# Patient Record
Sex: MALE | Race: WHITE | Employment: FULL TIME | URBAN - NONMETROPOLITAN AREA
[De-identification: names, ages, dates, MRNs, and addresses within clinical notes are randomized per-mention and may not be internally consistent; named-entity substitution may affect disease eponyms.]

---

## 2018-01-12 ENCOUNTER — INITIAL CONSULT (OUTPATIENT)
Dept: SURGERY | Age: 23
End: 2018-01-12
Payer: COMMERCIAL

## 2018-01-12 ENCOUNTER — HOSPITAL ENCOUNTER (OUTPATIENT)
Dept: LAB | Age: 23
Setting detail: SPECIMEN
Discharge: HOME | End: 2018-01-12
Payer: COMMERCIAL

## 2018-01-12 VITALS
BODY MASS INDEX: 25.06 KG/M2 | DIASTOLIC BLOOD PRESSURE: 76 MMHG | SYSTOLIC BLOOD PRESSURE: 120 MMHG | HEART RATE: 72 BPM | WEIGHT: 179 LBS | TEMPERATURE: 97 F | RESPIRATION RATE: 12 BRPM | HEIGHT: 71 IN

## 2018-01-12 DIAGNOSIS — R19.7 DIARRHEA, UNSPECIFIED TYPE: ICD-10-CM

## 2018-01-12 DIAGNOSIS — Z72.0 SMOKELESS TOBACCO USE: ICD-10-CM

## 2018-01-12 DIAGNOSIS — R10.13 EPIGASTRIC PAIN: ICD-10-CM

## 2018-01-12 DIAGNOSIS — R10.13 EPIGASTRIC PAIN: Primary | ICD-10-CM

## 2018-01-12 LAB
ABSOLUTE EOS #: 0.5 K/UL (ref 0–0.4)
ABSOLUTE IMMATURE GRANULOCYTE: ABNORMAL K/UL (ref 0–0.3)
ABSOLUTE LYMPH #: 2.3 K/UL (ref 1–4.8)
ABSOLUTE MONO #: 0.6 K/UL (ref 0.1–1.3)
ALBUMIN SERPL-MCNC: 4.8 G/DL (ref 3.5–5.2)
ALBUMIN/GLOBULIN RATIO: 1.8 (ref 1–2.5)
ALP BLD-CCNC: 74 U/L (ref 40–129)
ALT SERPL-CCNC: 26 U/L (ref 5–41)
ANION GAP SERPL CALCULATED.3IONS-SCNC: 13 MMOL/L (ref 9–17)
AST SERPL-CCNC: 19 U/L
BASOPHILS # BLD: 0 % (ref 0–2)
BASOPHILS ABSOLUTE: 0 K/UL (ref 0–0.2)
BILIRUB SERPL-MCNC: 0.2 MG/DL (ref 0.3–1.2)
BUN BLDV-MCNC: 15 MG/DL (ref 6–20)
BUN/CREAT BLD: 17 (ref 9–20)
CALCIUM SERPL-MCNC: 9.7 MG/DL (ref 8.6–10.4)
CHLORIDE BLD-SCNC: 100 MMOL/L (ref 98–107)
CO2: 26 MMOL/L (ref 20–31)
CREAT SERPL-MCNC: 0.87 MG/DL (ref 0.7–1.2)
DIFFERENTIAL TYPE: ABNORMAL
EOSINOPHILS RELATIVE PERCENT: 5 % (ref 1–8)
GFR AFRICAN AMERICAN: >60 ML/MIN
GFR NON-AFRICAN AMERICAN: >60 ML/MIN
GFR SERPL CREATININE-BSD FRML MDRD: ABNORMAL ML/MIN/{1.73_M2}
GFR SERPL CREATININE-BSD FRML MDRD: ABNORMAL ML/MIN/{1.73_M2}
GLUCOSE BLD-MCNC: 96 MG/DL (ref 70–99)
HCT VFR BLD CALC: 40.7 % (ref 41–53)
HEMOGLOBIN: 14.1 G/DL (ref 13.5–17.5)
IMMATURE GRANULOCYTES: ABNORMAL %
LYMPHOCYTES # BLD: 22 % (ref 15–43)
MCH RBC QN AUTO: 28.6 PG (ref 26–34)
MCHC RBC AUTO-ENTMCNC: 34.7 G/DL (ref 31–37)
MCV RBC AUTO: 82.5 FL (ref 80–100)
MONOCYTES # BLD: 5 % (ref 6–14)
PDW BLD-RTO: 13 % (ref 11–14.5)
PLATELET # BLD: 275 K/UL (ref 140–450)
PLATELET ESTIMATE: ABNORMAL
PMV BLD AUTO: 7.4 FL (ref 6–12)
POTASSIUM SERPL-SCNC: 4 MMOL/L (ref 3.7–5.3)
RBC # BLD: 4.93 M/UL (ref 4.5–5.9)
RBC # BLD: ABNORMAL 10*6/UL
SEG NEUTROPHILS: 68 % (ref 44–74)
SEGMENTED NEUTROPHILS ABSOLUTE COUNT: 7.1 K/UL (ref 1.8–7.7)
SODIUM BLD-SCNC: 139 MMOL/L (ref 135–144)
TOTAL PROTEIN: 7.4 G/DL (ref 6.4–8.3)
WBC # BLD: 10.5 K/UL (ref 4.5–13.5)
WBC # BLD: ABNORMAL 10*3/UL

## 2018-01-12 PROCEDURE — G8484 FLU IMMUNIZE NO ADMIN: HCPCS | Performed by: SURGERY

## 2018-01-12 PROCEDURE — 85025 COMPLETE CBC W/AUTO DIFF WBC: CPT

## 2018-01-12 PROCEDURE — 36415 COLL VENOUS BLD VENIPUNCTURE: CPT

## 2018-01-12 PROCEDURE — 80053 COMPREHEN METABOLIC PANEL: CPT

## 2018-01-12 PROCEDURE — 99204 OFFICE O/P NEW MOD 45 MIN: CPT | Performed by: SURGERY

## 2018-01-12 PROCEDURE — 4004F PT TOBACCO SCREEN RCVD TLK: CPT | Performed by: SURGERY

## 2018-01-12 PROCEDURE — G8427 DOCREV CUR MEDS BY ELIG CLIN: HCPCS | Performed by: SURGERY

## 2018-01-12 PROCEDURE — G8420 CALC BMI NORM PARAMETERS: HCPCS | Performed by: SURGERY

## 2018-01-12 ASSESSMENT — CROHNS DISEASE ACTIVITY INDEX (CDAI): CDAI SCORE: 0

## 2018-01-12 ASSESSMENT — ENCOUNTER SYMPTOMS
VOMITING: 0
ANAL BLEEDING: 0
ABDOMINAL PAIN: 1
DIARRHEA: 1
EYES NEGATIVE: 1
VOICE CHANGE: 0
SORE THROAT: 0
TROUBLE SWALLOWING: 0
BLOOD IN STOOL: 0
ABDOMINAL DISTENTION: 1
RECTAL PAIN: 0
CONSTIPATION: 1
RESPIRATORY NEGATIVE: 1
NAUSEA: 0
BACK PAIN: 1

## 2018-01-12 NOTE — PROGRESS NOTES
decreased concentration and dysphoric mood. The patient is not nervous/anxious and is not hyperactive. /76   Pulse 72   Temp 97 °F (36.1 °C) (Tympanic)   Resp 12   Ht 5' 11\" (1.803 m)   Wt 179 lb (81.2 kg)   BMI 24.97 kg/m²     Objective:   Physical Exam   Constitutional: He is oriented to person, place, and time. He appears well-developed and well-nourished. No distress. HENT:   Head: Normocephalic and atraumatic. Mouth/Throat: Oropharynx is clear and moist. No oropharyngeal exudate. Eyes: Conjunctivae and EOM are normal. Pupils are equal, round, and reactive to light. Right eye exhibits no discharge. Left eye exhibits no discharge. No scleral icterus. Neck: Normal range of motion. Neck supple. No tracheal deviation present. No thyromegaly present. Cardiovascular: Normal rate, regular rhythm and normal heart sounds. Pulmonary/Chest: Effort normal and breath sounds normal. No respiratory distress. He has no wheezes. He has no rales. He exhibits no tenderness. Abdominal: Soft. Bowel sounds are normal. He exhibits no distension and no mass. There is no tenderness. There is no rebound and no guarding. No hernia. Hernia confirmed negative in the ventral area, confirmed negative in the right inguinal area and confirmed negative in the left inguinal area. Lymphadenopathy:        Head (right side): No submental, no submandibular, no tonsillar, no preauricular, no posterior auricular and no occipital adenopathy present. Head (left side): No submental, no submandibular, no tonsillar, no preauricular, no posterior auricular and no occipital adenopathy present. He has no cervical adenopathy. Right cervical: No superficial cervical, no deep cervical and no posterior cervical adenopathy present. Left cervical: No superficial cervical, no deep cervical and no posterior cervical adenopathy present. He has no axillary adenopathy.         Right axillary: No pectoral and no

## 2018-01-12 NOTE — PATIENT INSTRUCTIONS
tobacco comes in many forms, such as snuff and chewing tobacco:  · Snuff is finely ground tobacco sold in cans or pouches. Most of the time, snuff is used by putting a \"pinch\" or \"dip\" between the lower lip or cheek and the gum. · Chewing tobacco is sold as loose leaves, plugs, or twists. It is chewed or placed between the cheek and the gum or teeth. There are plenty of reasons to stop using smokeless tobacco. These products are harmful. They are not risk-free alternatives to smoking. Smokeless tobacco contains nicotine, which is addicting. Though using smokeless tobacco is less harmful than smoking cigarettes, it can cause serious health problems, such as:  · White patches or red sores in your mouth that can turn into mouth cancer involving the lip, tongue, or cheek. · Tooth loss and other dental problems. · Gum disease. Your gums may pull away from your teeth and not grow back. People who use smokeless tobacco crave the nicotine in it. Giving up smokeless tobacco is much harder than simply changing a habit. Your body has to stop craving the nicotine. It is hard to quit, but you can do it. Many tools are available for people who want to quit using smokeless tobacco. You may find that combining tools works best for you. There are several steps to quitting. First you get ready to quit. Then you get support to help you. After that, you learn new skills and behaviors to quit. For many people, a necessary step is getting and using medicine. Your doctor will help you set up the plan that best meets your needs. You may want to attend a tobacco cessation program. When you choose a program, look for one that has proven success. Ask your doctor for ideas. You will greatly increase your chances of success if you take medicine as well as get counseling or join a cessation program.  Some of the changes you feel when you first quit smokeless tobacco are uncomfortable.  Your body will miss the nicotine at first, and you may feel short-tempered and grumpy. You may have trouble sleeping or concentrating. Medicine can help you deal with these symptoms. You may struggle with changing your habits and rituals. The last step is the tricky one: Be prepared for the urge to use smokeless tobacco to continue for a time. This is a lot to deal with, but keep at it. You will feel better. Follow-up care is a key part of your treatment and safety. Be sure to make and go to all appointments, and call your doctor if you are having problems. It's also a good idea to know your test results and keep a list of the medicines you take. How can you care for yourself at home? · Ask your family, friends, and coworkers for support. You have a better chance of quitting if you have help and support. · Join a support group for people who are trying to quit using smokeless tobacco.  · Set a quit date. Pick your date carefully so that it is not right in the middle of a big deadline or stressful time. After you quit, do not use smokeless tobacco even once. Get rid of all spit cups, cans, and pouches after your last use. Clean your house and your clothes so that they do not smell of tobacco.  · Learn how to be a non-user. Think about ways you can avoid those things that make you reach for tobacco.  ¨ Learn some ways to deal with cravings, like calling a friend or going for a walk. Cravings often pass. ¨ Avoid situations that put you at greatest risk for using smokeless tobacco. For some people, it is hard to spend time with friends without dipping or chewing. For others, they might skip a coffee break with coworkers who smoke or use smokeless tobacco.  ¨ Change your daily routine. Take a different route to work, or eat a meal in a different place. · Cut down on stress. Calm yourself or release tension by doing an activity you enjoy, such as reading a book, taking a hot bath, or gardening. · Talk to your doctor or pharmacist about nicotine replacement therapy.  You

## 2021-02-18 ENCOUNTER — PROCEDURE VISIT (OUTPATIENT)
Dept: SURGERY | Age: 26
End: 2021-02-18
Payer: COMMERCIAL

## 2021-02-18 VITALS
TEMPERATURE: 96.2 F | BODY MASS INDEX: 27.44 KG/M2 | HEIGHT: 71 IN | RESPIRATION RATE: 14 BRPM | DIASTOLIC BLOOD PRESSURE: 76 MMHG | SYSTOLIC BLOOD PRESSURE: 118 MMHG | WEIGHT: 196 LBS | HEART RATE: 70 BPM

## 2021-02-18 DIAGNOSIS — L72.0 EPIDERMAL CYST OF FACE: ICD-10-CM

## 2021-02-18 DIAGNOSIS — L72.9 SCALP CYST: Primary | ICD-10-CM

## 2021-02-18 PROCEDURE — G8419 CALC BMI OUT NRM PARAM NOF/U: HCPCS | Performed by: SURGERY

## 2021-02-18 PROCEDURE — 99202 OFFICE O/P NEW SF 15 MIN: CPT | Performed by: SURGERY

## 2021-02-18 PROCEDURE — G8484 FLU IMMUNIZE NO ADMIN: HCPCS | Performed by: SURGERY

## 2021-02-18 PROCEDURE — 4004F PT TOBACCO SCREEN RCVD TLK: CPT | Performed by: SURGERY

## 2021-02-18 PROCEDURE — G8427 DOCREV CUR MEDS BY ELIG CLIN: HCPCS | Performed by: SURGERY

## 2021-02-19 NOTE — PROGRESS NOTES
Julisa Rosen is a 26 y.o. male          CC:    . Cyst (scalp)      HISTORY OF PRESENT ILLNESS:    Pt is a 27 yo male here with complaints of 5 cysts in the scalp that have been bothering him more last few months. His hard hat rubs on them. He would like them excised.       Review of Systems:    General:  Fever: Negative  Weight Change:Negative  Night Sweats: Negative    Eye:  Blurry Vision:Negative  Double Vision: Negative    Ent:  Headaches: Negative  Sore throat: Negative    Allergy/Immunology:  Hives: Negative  Latex allergy: Negative    Hematology/Lymphatic:  Bleeding Problems: Negative  Blood Clots: Negative  Swollen Lymph Nodes: Negative    Lungs:  Cough: Negative  SOB: Negative  Wheezing:Negative    Cardiovascular:  Chest Pain: Negative  Palpitations:Negative    GI:   Decreased Appetite: Negative  Heartburn: Negative  Dysphagia: Negative  Nausea/Vomiting: Negative  Abdominal Pain: Negative  Change in Bowels:Negative  Constipation: Negative  Diarrhea: Negative  Rectal Bleeding: Negative    :   Dysuria: Negative  Increase Urinary Frequency/Urgency: Negative    Neuro:  Seizures: Negative  Confusion: Negative        PAST MEDICAL HISTORY:        Family History   Problem Relation Age of Onset    Cancer Paternal Grandmother         liver    No Known Problems Mother     No Known Problems Father     No Known Problems Sister     Colon Cancer Neg Hx     Colon Polyps Neg Hx     Crohn's Disease Neg Hx     Ulcerative Colitis Neg Hx      Social History     Socioeconomic History    Marital status: Single     Spouse name: Not on file    Number of children: Not on file    Years of education: Not on file    Highest education level: Not on file   Occupational History    Occupation:    Social Needs    Financial resource strain: Not on file    Food insecurity     Worry: Not on file     Inability: Not on file    Transportation needs     Medical: Not on file     Non-medical: Not on file   Tobacco Use  Smoking status: Never Smoker    Smokeless tobacco: Current User     Types: Chew   Substance and Sexual Activity    Alcohol use: Yes     Comment: 1 beer every couple of months.  Drug use: No    Sexual activity: Not on file   Lifestyle    Physical activity     Days per week: Not on file     Minutes per session: Not on file    Stress: Not on file   Relationships    Social connections     Talks on phone: Not on file     Gets together: Not on file     Attends Christian service: Not on file     Active member of club or organization: Not on file     Attends meetings of clubs or organizations: Not on file     Relationship status: Not on file    Intimate partner violence     Fear of current or ex partner: Not on file     Emotionally abused: Not on file     Physically abused: Not on file     Forced sexual activity: Not on file   Other Topics Concern    Not on file   Social History Narrative    Not on file     Past Surgical History:   Procedure Laterality Date    WISDOM TOOTH EXTRACTION       History reviewed. No pertinent past medical history. No current outpatient medications on file prior to visit. No current facility-administered medications on file prior to visit. Allergies as of 02/18/2021 - Review Complete 02/18/2021   Allergen Reaction Noted    Ceclor [cefaclor]  01/12/2018    Lac bovis Other (See Comments) 01/31/2018    Pcn [penicillins]  01/12/2018       PHYSICAL EXAM:    Blood pressure 118/76, pulse 70, temperature 96.2 °F (35.7 °C), temperature source Tympanic, resp. rate 14, height 5' 11\" (1.803 m), weight 196 lb (88.9 kg). Gen:  A and O x 3, NAD, well nourished  Eyes:  Sclera non icterus, PERRL, 5 mm cyst upper eyelid  Lungs:  CTA, symmetrical  Chest:  RRR, no murmurs  Abd:  Soft, NT, ND, no HSM, no bruits  Scalp:  4 cyst in the scalp ranging from 5 mm to 15mm.       ASSESS MENT:    1.  4 cyst scalp  2.  1 cyst right upper eyebrow        PLAN:

## 2021-08-01 ENCOUNTER — HOSPITAL ENCOUNTER (EMERGENCY)
Age: 26
Discharge: HOME | End: 2021-08-01
Attending: EMERGENCY MEDICINE
Payer: COMMERCIAL

## 2021-08-01 VITALS
TEMPERATURE: 96.8 F | OXYGEN SATURATION: 98 % | BODY MASS INDEX: 29.4 KG/M2 | RESPIRATION RATE: 14 BRPM | WEIGHT: 210 LBS | DIASTOLIC BLOOD PRESSURE: 74 MMHG | SYSTOLIC BLOOD PRESSURE: 132 MMHG | HEIGHT: 71 IN | HEART RATE: 78 BPM

## 2021-08-01 DIAGNOSIS — T15.01XA FOREIGN BODY OF RIGHT CORNEA, INITIAL ENCOUNTER: Primary | ICD-10-CM

## 2021-08-01 PROCEDURE — 6370000000 HC RX 637 (ALT 250 FOR IP)

## 2021-08-01 PROCEDURE — 99283 EMERGENCY DEPT VISIT LOW MDM: CPT

## 2021-08-01 PROCEDURE — 65222 REMOVE FOREIGN BODY FROM EYE: CPT

## 2021-08-01 RX ORDER — SULFACETAMIDE SODIUM 100 MG/ML
1 SOLUTION/ DROPS OPHTHALMIC 4 TIMES DAILY
Status: DISCONTINUED | OUTPATIENT
Start: 2021-08-01 | End: 2021-08-01 | Stop reason: HOSPADM

## 2021-08-01 RX ORDER — SULFACETAMIDE SODIUM 100 MG/ML
SOLUTION/ DROPS OPHTHALMIC
Status: COMPLETED
Start: 2021-08-01 | End: 2021-08-01

## 2021-08-01 RX ADMIN — SULFACETAMIDE SODIUM 1 DROP: 100 SOLUTION/ DROPS OPHTHALMIC at 03:11

## 2021-08-01 ASSESSMENT — PAIN SCALES - GENERAL: PAINLEVEL_OUTOF10: 6

## 2021-08-01 ASSESSMENT — PAIN DESCRIPTION - PAIN TYPE: TYPE: ACUTE PAIN

## 2021-08-01 ASSESSMENT — PAIN DESCRIPTION - LOCATION: LOCATION: EYE

## 2021-08-01 ASSESSMENT — PAIN DESCRIPTION - ORIENTATION: ORIENTATION: RIGHT

## 2021-08-01 ASSESSMENT — PAIN DESCRIPTION - DESCRIPTORS: DESCRIPTORS: SHOOTING

## 2021-08-01 NOTE — ED PROVIDER NOTES
distress  HEENT: Head is atraumatic conjunctiva are clear bilateral pupils are equal and reactive slit-lamp examination reveals a small metallic foreign body with associated rust ring at approximately the 7 o'clock position mid corneal region anterior chamber is within normal  Respiratory: Patient has nonlabored respirations    DIFFERENTIAL DIAGNOSIS/ MDM:     Foreign body    DIAGNOSTIC RESULTS     EKG: All EKG's are interpreted by the Emergency Department Physician who either signs or Co-signs this chart in the absence of a cardiologist.        RADIOLOGY:   I directly visualized the following  images and reviewed the radiologist interpretations:  No orders to display         LABS:  Labs Reviewed - No data to display      EMERGENCY DEPARTMENT COURSE:   Vitals:    Vitals:    08/01/21 0238 08/01/21 0314   BP: (!) 152/94 132/74   Pulse: 82 78   Resp: 14 14   Temp: 96.8 °F (36 °C)    TempSrc: Tympanic    SpO2: 100% 98%   Weight: 210 lb (95.3 kg)    Height: 5' 11\" (1.803 m)      -------------------------  BP: 132/74, Temp: 96.8 °F (36 °C), Pulse: 78, Resp: 14    Orders Placed This Encounter   Medications    DISCONTD: sulfacetamide (BLEPH-10) 10 % ophthalmic solution 1 drop    sulfacetamide (BLEPH-10) 10 % ophthalmic solution     ABDI AZAR: cabinet override           Re-evaluation Notes    Foreign body was removed easily with Q-tip swab however there was some residual rust ring present no further foreign bodies were identified patient will be placed on Bleph-10 at this time follow-up to ophthalmology for removal of rust ring return if worse    CRITICAL CARE:   None      CONSULTS:      PROCEDURES:  None    FINAL IMPRESSION      1.  Foreign body of right cornea, initial encounter          DISPOSITION/PLAN   DISPOSITION Decision To Discharge 08/01/2021 02:54:14 AM      Condition on Disposition    Stable    PATIENT REFERRED TO:  Paxton Mackey MD  [id number]  [phone]    Call in 1 day  For Hospital Follow Up      DISCHARGE MEDICATIONS:  There are no discharge medications for this patient. (Please note that portions of this note were completed with a voice recognition program.  Efforts were made to edit the dictations but occasionally words are mis-transcribed.)    Agus Randolph MD,, MD, F.A.C.E.P.   Attending Emergency Physician        Agus Randolph MD  08/01/21 4003

## 2021-12-02 PROBLEM — Z72.0 SMOKELESS TOBACCO USE: Status: RESOLVED | Noted: 2018-01-12 | Resolved: 2021-12-02

## 2022-10-27 ENCOUNTER — PROCEDURE VISIT (OUTPATIENT)
Dept: SURGERY | Age: 27
End: 2022-10-27
Payer: COMMERCIAL

## 2022-10-27 ENCOUNTER — HOSPITAL ENCOUNTER (OUTPATIENT)
Age: 27
Setting detail: SPECIMEN
Discharge: HOME | End: 2022-10-27
Payer: COMMERCIAL

## 2022-10-27 VITALS
TEMPERATURE: 97.1 F | DIASTOLIC BLOOD PRESSURE: 60 MMHG | BODY MASS INDEX: 27.92 KG/M2 | HEIGHT: 71 IN | OXYGEN SATURATION: 98 % | SYSTOLIC BLOOD PRESSURE: 120 MMHG | HEART RATE: 76 BPM | WEIGHT: 199.4 LBS

## 2022-10-27 DIAGNOSIS — L72.9 SCALP CYST: ICD-10-CM

## 2022-10-27 DIAGNOSIS — L72.9 SCALP CYST: Primary | ICD-10-CM

## 2022-10-27 PROCEDURE — 88304 TISSUE EXAM BY PATHOLOGIST: CPT

## 2022-10-27 PROCEDURE — 11422 EXC H-F-NK-SP B9+MARG 1.1-2: CPT | Performed by: SURGERY

## 2022-10-31 LAB — DERMATOLOGY PATHOLOGY REPORT: NORMAL

## 2023-11-07 ENCOUNTER — OFFICE VISIT (OUTPATIENT)
Dept: FAMILY MEDICINE CLINIC | Age: 28
End: 2023-11-07
Payer: COMMERCIAL

## 2023-11-07 VITALS
HEIGHT: 71 IN | HEART RATE: 98 BPM | SYSTOLIC BLOOD PRESSURE: 110 MMHG | BODY MASS INDEX: 27.81 KG/M2 | OXYGEN SATURATION: 98 % | DIASTOLIC BLOOD PRESSURE: 74 MMHG

## 2023-11-07 DIAGNOSIS — M62.838 MUSCLE SPASMS OF BOTH LOWER EXTREMITIES: ICD-10-CM

## 2023-11-07 DIAGNOSIS — S82.91XD CLOSED FRACTURE OF MULTIPLE BONES OF BOTH LOWER EXTREMITIES WITH ROUTINE HEALING, SUBSEQUENT ENCOUNTER: Primary | ICD-10-CM

## 2023-11-07 DIAGNOSIS — S82.92XD CLOSED FRACTURE OF MULTIPLE BONES OF BOTH LOWER EXTREMITIES WITH ROUTINE HEALING, SUBSEQUENT ENCOUNTER: Primary | ICD-10-CM

## 2023-11-07 PROCEDURE — 99204 OFFICE O/P NEW MOD 45 MIN: CPT | Performed by: STUDENT IN AN ORGANIZED HEALTH CARE EDUCATION/TRAINING PROGRAM

## 2023-11-07 RX ORDER — IBUPROFEN 800 MG/1
TABLET ORAL
COMMUNITY
Start: 2023-10-31

## 2023-11-07 RX ORDER — DIAZEPAM 5 MG/1
5 TABLET ORAL EVERY 8 HOURS PRN
Qty: 30 TABLET | Refills: 0 | Status: SHIPPED | OUTPATIENT
Start: 2023-11-07 | End: 2023-11-17

## 2023-11-07 RX ORDER — ENOXAPARIN SODIUM 100 MG/ML
INJECTION SUBCUTANEOUS
COMMUNITY
Start: 2023-10-31

## 2023-11-07 RX ORDER — OXYCODONE HYDROCHLORIDE 5 MG/1
TABLET ORAL
COMMUNITY
Start: 2023-10-31

## 2023-11-07 RX ORDER — DIAZEPAM 5 MG/1
TABLET ORAL
COMMUNITY
Start: 2023-10-31 | End: 2023-11-07 | Stop reason: SDUPTHER

## 2023-11-07 SDOH — ECONOMIC STABILITY: HOUSING INSECURITY
IN THE LAST 12 MONTHS, WAS THERE A TIME WHEN YOU DID NOT HAVE A STEADY PLACE TO SLEEP OR SLEPT IN A SHELTER (INCLUDING NOW)?: NO

## 2023-11-07 SDOH — ECONOMIC STABILITY: INCOME INSECURITY: HOW HARD IS IT FOR YOU TO PAY FOR THE VERY BASICS LIKE FOOD, HOUSING, MEDICAL CARE, AND HEATING?: NOT HARD AT ALL

## 2023-11-07 SDOH — ECONOMIC STABILITY: FOOD INSECURITY: WITHIN THE PAST 12 MONTHS, THE FOOD YOU BOUGHT JUST DIDN'T LAST AND YOU DIDN'T HAVE MONEY TO GET MORE.: NEVER TRUE

## 2023-11-07 SDOH — ECONOMIC STABILITY: FOOD INSECURITY: WITHIN THE PAST 12 MONTHS, YOU WORRIED THAT YOUR FOOD WOULD RUN OUT BEFORE YOU GOT MONEY TO BUY MORE.: NEVER TRUE

## 2023-11-07 ASSESSMENT — PATIENT HEALTH QUESTIONNAIRE - PHQ9
2. FEELING DOWN, DEPRESSED OR HOPELESS: 0
SUM OF ALL RESPONSES TO PHQ QUESTIONS 1-9: 0
1. LITTLE INTEREST OR PLEASURE IN DOING THINGS: 0
SUM OF ALL RESPONSES TO PHQ QUESTIONS 1-9: 0
SUM OF ALL RESPONSES TO PHQ9 QUESTIONS 1 & 2: 0
SUM OF ALL RESPONSES TO PHQ QUESTIONS 1-9: 0
SUM OF ALL RESPONSES TO PHQ QUESTIONS 1-9: 0
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
## 2023-11-08 ASSESSMENT — ENCOUNTER SYMPTOMS
TROUBLE SWALLOWING: 0
NAUSEA: 0
DIARRHEA: 0
VOMITING: 0
CONSTIPATION: 0
COUGH: 0
EYE PAIN: 0
ABDOMINAL PAIN: 0
BLOOD IN STOOL: 0
SHORTNESS OF BREATH: 0

## 2023-12-27 ENCOUNTER — HOSPITAL ENCOUNTER (OUTPATIENT)
Dept: PHYSICAL THERAPY | Age: 28
Setting detail: THERAPIES SERIES
Discharge: HOME | End: 2023-12-27
Payer: COMMERCIAL

## 2023-12-27 PROCEDURE — 97110 THERAPEUTIC EXERCISES: CPT | Performed by: PHYSICAL THERAPY ASSISTANT

## 2023-12-27 NOTE — FLOWSHEET NOTE
Physical Therapy Daily Treatment Note    Date:  2023    Patient Name:  Lucrecia Madrid    :  1995  MRN: [id number]  Restrictions/Precautions:   NWB R LE, WBAT L LE in Cam Boot--Able to wean to a shoe   Medical/Treatment Diagnosis Information:     S82.871D Closed Displaced Pilon Fracture of R Tibia with routine healing  S82.892D Closed Displaced L ankle with Routine Healing            Insurance/Certification information:  [facility]  Physician Information:   Wilber Martinez PA-C    Plan of care signed (Y/N):  n  Visit# / total visits:  3/10  Pain level: /10       RTD: 2024    Time In: 1035  Time Out: 1059    Progress Note: []  Yes  [x]  No  Next due by: Visit #10      Subjective:  Pain this date rated 0/10 through ankles this date. Soreness only. Objective: MARY complete per flow chart to facilitate strength, motion and stability to allow ease with daily activities and ambulation. Verbal cuing for progression, technique and order of exercises. Difficulty with and range of motion remains but overall improvement noted. Initiated and advanced several exercises to improve strength and motion. Observation:   Test measurements:      Exercises: T-band ok on the Left. No resistance on the R.    Exercise/Equipment Resistance/Repetitions Other comments        Gt Training with Walker  1 lap         Ankle AROM bilaterally  15x HEP      HEP    Ankle Circles  15x ea HEP    Ankle ABC's  1x ea HEP    L Baps  10x ea    L standing HR/TR  10x     t-band ankle  Left only, x15 red    Seated March/LAQ  10x3#         3-way hip 15x Open chain, WB on left    15x                   [] Provided verbal/tactile cueing for activities related to strengthening, flexibility, endurance, ROM. (90800)  [] Provided verbal/tactile cueing for activities related to improving balance, coordination, kinesthetic sense, posture, motor skill, proprioception. (91329)    Therapeutic Activities:     [] Therapeutic activities, direct

## 2023-12-28 ENCOUNTER — HOSPITAL ENCOUNTER (OUTPATIENT)
Dept: PHYSICAL THERAPY | Age: 28
Setting detail: THERAPIES SERIES
Discharge: HOME | End: 2023-12-28
Payer: COMMERCIAL

## 2023-12-28 PROCEDURE — 97110 THERAPEUTIC EXERCISES: CPT

## 2023-12-28 NOTE — FLOWSHEET NOTE
mod I  Long Term Goal 5: Improve Foot Function Index to 50% DIsabiltiy or less. Additional Goals?: Yes  Long term goal 6: Add additional goals upon allowance for Weightbearing of the R LE. Plan:   [x] Continue per plan of care [] Alter current plan (see comments)  [] Plan of care initiated [] Hold pending MD visit [] Discharge    Plan for Next Session:  Monitor tolerance to open chain exercises and advance as able.      Electronically signed by:  Nicola Champion PT

## 2024-01-03 ENCOUNTER — HOSPITAL ENCOUNTER (OUTPATIENT)
Dept: PHYSICAL THERAPY | Age: 29
Setting detail: THERAPIES SERIES
Discharge: HOME | End: 2024-01-03
Payer: COMMERCIAL

## 2024-01-03 PROCEDURE — 97110 THERAPEUTIC EXERCISES: CPT

## 2024-01-03 NOTE — FLOWSHEET NOTE
Activities:     [] Therapeutic activities, direct (one-on-one) patient contact (use of dynamic activities to improve functional performance). (78814)    Gait:   [] Provided training and instruction to the patient for ambulation re-education. (49576)    Self-Care/ADL's  [] Self-care/home management training and compensatory training, meal preparation, safety procedures, and instructions in use of assistive technology devices/adaptive equipment, direct one-on-one contact. (43188)    Home Exercise Program:     [] Reviewed/Progressed HEP activities related to strengthening, flexibility, endurance, ROM. (94416)  [] Reviewed/Progressed HEP activities related to improving balance, coordination, kinesthetic sense, posture, motor skill, proprioception.  (22959)    Manual Treatments:    [] Provided manual therapy to mobilize soft tissue/joints for the purpose of modulating pain, promoting relaxation,  increasing ROM, reducing/eliminating soft tissue swelling/inflammation/restriction, improving soft tissue extensibility. (53874)    Service Based Modalities:      Timed Code Treatment Minutes:   25' ex     Total Treatment Minutes:   25''    Treatment/Activity Tolerance:  [x] Patient tolerated treatment well [] Patient limited by fatique  [] Patient limited by pain  [] Patient limited by other medical complications  [] Other:     Prognosis: [x] Good [] Fair  [] Poor    Patient Requires Follow-up: [x] Yes  [] No      Goals:  Short Term Goals  Time Frame for Short Term Goals: 3 weeks  Short Term Goal 1: Initiate HEP    Long Term Goals  Time Frame for Long Term Goals : 6 weeks  Long Term Goal 1: Independent in HEP  Long Term Goal 2: Improve Ankle AROM to DF 5, PF to 40, Inv to 35, Ev to 10 to allow for normal gait pattern.  Long Term Goal 3: Improve Left ankle strength to 5/5 to improve gait tolerance.  Long Term Goal 4: Patient to be able to amb with NWB on the R LE x 250ft with least restrictive device and mod I  Long Term Goal 5:

## 2024-01-05 ENCOUNTER — HOSPITAL ENCOUNTER (OUTPATIENT)
Dept: PHYSICAL THERAPY | Age: 29
Setting detail: THERAPIES SERIES
Discharge: HOME | End: 2024-01-05
Payer: COMMERCIAL

## 2024-01-05 PROCEDURE — 97110 THERAPEUTIC EXERCISES: CPT

## 2024-01-05 NOTE — FLOWSHEET NOTE
activities related to strengthening, flexibility, endurance, ROM. (35468)  [] Provided verbal/tactile cueing for activities related to improving balance, coordination, kinesthetic sense, posture, motor skill, proprioception. (20511)    Therapeutic Activities:     [] Therapeutic activities, direct (one-on-one) patient contact (use of dynamic activities to improve functional performance). (78530)    Gait:   [] Provided training and instruction to the patient for ambulation re-education. (79220)    Self-Care/ADL's  [] Self-care/home management training and compensatory training, meal preparation, safety procedures, and instructions in use of assistive technology devices/adaptive equipment, direct one-on-one contact. (31444)    Home Exercise Program:     [] Reviewed/Progressed HEP activities related to strengthening, flexibility, endurance, ROM. (39677)  [] Reviewed/Progressed HEP activities related to improving balance, coordination, kinesthetic sense, posture, motor skill, proprioception.  (67229)    Manual Treatments:    [] Provided manual therapy to mobilize soft tissue/joints for the purpose of modulating pain, promoting relaxation,  increasing ROM, reducing/eliminating soft tissue swelling/inflammation/restriction, improving soft tissue extensibility. (15183)    Service Based Modalities:      Timed Code Treatment Minutes:   33' ex     Total Treatment Minutes:   33'    Treatment/Activity Tolerance:  [x] Patient tolerated treatment well [] Patient limited by fatique  [] Patient limited by pain  [] Patient limited by other medical complications  [] Other:     Prognosis: [x] Good [] Fair  [] Poor    Patient Requires Follow-up: [x] Yes  [] No      Goals:  Short Term Goals  Time Frame for Short Term Goals: 3 weeks  Short Term Goal 1: Initiate HEP (compliant)    Long Term Goals  Time Frame for Long Term Goals : 6 weeks  Long Term Goal 1: Independent in HEP (compliant)  Long Term Goal 2: Improve Ankle AROM to DF 5, PF to

## 2024-01-10 ENCOUNTER — HOSPITAL ENCOUNTER (OUTPATIENT)
Dept: PHYSICAL THERAPY | Age: 29
Setting detail: THERAPIES SERIES
Discharge: HOME | End: 2024-01-10
Payer: COMMERCIAL

## 2024-01-10 PROCEDURE — 97110 THERAPEUTIC EXERCISES: CPT

## 2024-01-10 NOTE — FLOWSHEET NOTE
1: Initiate HEP (compliant)    Long Term Goals  Time Frame for Long Term Goals : 6 weeks  Long Term Goal 1: Independent in HEP (compliant)  Long Term Goal 2: Improve Ankle AROM to DF 5, PF to 40, Inv to 35, Ev to 10 to allow for normal gait pattern.  Long Term Goal 3: Improve Left ankle strength to 5/5 to improve gait tolerance.  Long Term Goal 4: Patient to be able to amb with NWB on the R LE x 250ft with least restrictive device and mod I  Long Term Goal 5: Improve Foot Function Index to 50% DIsabiltiy or less.  Additional Goals?: Yes  Long term goal 6: Add additional goals upon allowance for Weightbearing of the R LE.      Plan:   [x] Continue per plan of care [] Alter current plan (see comments)  [] Plan of care initiated [] Hold pending MD visit [] Discharge    Plan for Next Session:  Monitor tolerance to open chain exercises and advance as able.     Electronically signed by:  Cristal Silva PTA

## 2024-01-12 ENCOUNTER — HOSPITAL ENCOUNTER (OUTPATIENT)
Dept: PHYSICAL THERAPY | Age: 29
Setting detail: THERAPIES SERIES
Discharge: HOME | End: 2024-01-12
Payer: COMMERCIAL

## 2024-01-12 PROCEDURE — 97110 THERAPEUTIC EXERCISES: CPT

## 2024-01-12 NOTE — FLOWSHEET NOTE
coordination, kinesthetic sense, posture, motor skill, proprioception. (67551)    Therapeutic Activities:     [] Therapeutic activities, direct (one-on-one) patient contact (use of dynamic activities to improve functional performance). (04391)    Gait:   [] Provided training and instruction to the patient for ambulation re-education. (64408)    Self-Care/ADL's  [] Self-care/home management training and compensatory training, meal preparation, safety procedures, and instructions in use of assistive technology devices/adaptive equipment, direct one-on-one contact. (15860)    Home Exercise Program:     [] Reviewed/Progressed HEP activities related to strengthening, flexibility, endurance, ROM. (89486)  [] Reviewed/Progressed HEP activities related to improving balance, coordination, kinesthetic sense, posture, motor skill, proprioception.  (17665)    Manual Treatments:    [] Provided manual therapy to mobilize soft tissue/joints for the purpose of modulating pain, promoting relaxation,  increasing ROM, reducing/eliminating soft tissue swelling/inflammation/restriction, improving soft tissue extensibility. (49909)    Service Based Modalities:      Timed Code Treatment Minutes:   38' ex     Total Treatment Minutes:   38'    Treatment/Activity Tolerance:  [x] Patient tolerated treatment well [] Patient limited by fatique  [] Patient limited by pain  [] Patient limited by other medical complications  [] Other:     Prognosis: [x] Good [] Fair  [] Poor    Patient Requires Follow-up: [x] Yes  [] No      Goals:  Short Term Goals  Time Frame for Short Term Goals: 3 weeks  Short Term Goal 1: Initiate HEP (compliant)    Long Term Goals  Time Frame for Long Term Goals : 6 weeks  Long Term Goal 1: Independent in HEP (compliant)  Long Term Goal 2: Improve Ankle AROM to DF 5, PF to 40, Inv to 35, Ev to 10 to allow for normal gait pattern.  Long Term Goal 3: Improve Left ankle strength to 5/5 to improve gait tolerance.  Long Term Goal

## 2024-01-17 ENCOUNTER — HOSPITAL ENCOUNTER (OUTPATIENT)
Dept: PHYSICAL THERAPY | Age: 29
Setting detail: THERAPIES SERIES
Discharge: HOME | End: 2024-01-17
Payer: COMMERCIAL

## 2024-01-17 PROCEDURE — 97110 THERAPEUTIC EXERCISES: CPT

## 2024-01-17 NOTE — FLOWSHEET NOTE
Physical Therapy Daily Treatment Note    Date:  2024    Patient Name:  Adam Farias    :  1995  MRN: [id number]  Restrictions/Precautions:   NWB R LE, WBAT L LE in Cam Boot--Able to wean to a shoe   Medical/Treatment Diagnosis Information:    S82.871D Closed Displaced Pilon Fracture of R Tibia with routine healing  S82.892D Closed Displaced L ankle with Routine Healing            Insurance/Certification information:  BCBS  Physician Information:   Ximena Logan PA-C    Plan of care signed (Y/N):  n  Visit# / total visits:  9/10  Pain level: 410 Soreness on LLE, no pain on RLE      RTD: 2024    Time In: 10:15 Time Out: 10:50    Progress Note: []  Yes  [x]  No  Next due by: Visit #10      Subjective: Has been using walker full time at home.     Objective: MARY complete per flow chart to facilitate strength, motion and stability to allow ease with daily activities and ambulation. Verbal cuing for progression, technique and order of exercises. Patient included seated rest breaks into session this time, overall doing better. Quad continues to lack strength in Wbing exercises.     24-RTD    Observation:   Test measurements:      Exercises: T-band ok on the Left.  No resistance on the R.   Exercise/Equipment Resistance/Repetitions Other comments        Gt Training with RW Walker  1 lap         Ankle AROM bilaterally  3# 15x HEP      HEP    Ankle Circles  3# 15x ea HEP    Ankle ABC's  3# 1x ea HEP    L Baps  3# L2x 20x    L standing HR/TR  3# 20x     t-band ankle  Left only, x15 green    Seated March/LAQ  20x3#    Seated Hip add/abd  Ball 5\" x 20/grn x 20     3-way hip 15x, foam Open chain, WB on left   HS curls, March, SL squat 15x, foam Open chain, WB on left   STS 15x LLE WB   Golfer  10x    SL cone tap 10x, 3 cones    SL rebounder 20x    SL step tap down 10x, 4 inch          [] Provided verbal/tactile cueing for activities related to strengthening, flexibility, endurance, ROM. (55263)  []

## 2024-01-19 ENCOUNTER — HOSPITAL ENCOUNTER (OUTPATIENT)
Dept: PHYSICAL THERAPY | Age: 29
Setting detail: THERAPIES SERIES
Discharge: HOME | End: 2024-01-19
Payer: COMMERCIAL

## 2024-01-19 PROCEDURE — 97110 THERAPEUTIC EXERCISES: CPT | Performed by: PHYSICAL THERAPY ASSISTANT

## 2024-01-19 NOTE — FLOWSHEET NOTE
Physical Therapy Daily Treatment Note    Date:  2024    Patient Name:  Adam Farias    :  1995  MRN: [id number]  Restrictions/Precautions:   NWB R LE, WBAT L LE in Cam Boot--Able to wean to a shoe   Medical/Treatment Diagnosis Information:    S82.871D Closed Displaced Pilon Fracture of R Tibia with routine healing  S82.892D Closed Displaced L ankle with Routine Healing            Insurance/Certification information:  [facility]  Physician Information:   Ximena Logan PA-C    Plan of care signed (Y/N):  n  Visit# / total visits:  9/10  Pain level: 4/10 Soreness on LLE, no pain on RLE      RTD: 2024    Time In: 10:00 Time Out: 1039    Progress Note: []  Yes  [x]  No  Next due by: Visit #10      Subjective: Has been using walker full time at home. Notes taking boot off when at home. Pain this date rated 0/10.     Objective: MARY complete per flow chart to facilitate strength, motion and stability to allow ease with daily activities and ambulation. Verbal cuing for progression, technique and order of exercises. Patient included seated rest breaks into session this time, overall doing better. Quad continues to lack strength in Wbing exercises. Initiated and advanced several exercises to improve motion and strength.     24-RTD    Observation:   Test measurements:      Exercises: T-band ok on the Left.  No resistance on the R.   Exercise/Equipment Resistance/Repetitions Other comments        Gt Training with RW Walker  1 lap         Ankle AROM bilaterally  3# 15x HEP      HEP    Ankle Circles  3# 15x ea HEP    Ankle ABC's  3# 1x ea HEP    L Baps  3# L2x 20x    L standing HR/TR  3# 20x     t-band ankle  Left only, x15 green    Seated March/LAQ  20x3#    Seated Hip add/abd  Ball 5\" x 20/grn x 20     3-way hip 20x, foam Open chain, WB on left   HS curls, March, SL squat 20x, foam Open chain, WB on left   STS 15x LLE WB   Golfer  10x    SL cone tap 10x, 3 cones    SL rebounder 20x    SL step tap down 10x, 4

## 2024-01-24 ENCOUNTER — HOSPITAL ENCOUNTER (OUTPATIENT)
Dept: PHYSICAL THERAPY | Age: 29
Setting detail: THERAPIES SERIES
Discharge: HOME | End: 2024-01-24
Payer: COMMERCIAL

## 2024-01-24 PROCEDURE — 97110 THERAPEUTIC EXERCISES: CPT

## 2024-01-24 NOTE — FLOWSHEET NOTE
Physical Therapy Daily Treatment Note    Date:  2024    Patient Name:  Adam Farias    :  1995  MRN: [id number]  Restrictions/Precautions:   NWB R LE, WBAT L LE in Cam Boot--Able to wean to a shoe   Medical/Treatment Diagnosis Information:    S82.871D Closed Displaced Pilon Fracture of R Tibia with routine healing  S82.892D Closed Displaced L ankle with Routine Healing            Insurance/Certification information:  [employer]  Physician Information:   Ximena Logan PA-C    Plan of care signed (Y/N):  n  Visit# / total visits:  10/10  Pain level: 4/10 Soreness on LLE, no pain on RLE      RTD: 2024    Time In: 10:13 Time Out: 10:55    Progress Note: []  Yes  [x]  No  Next due by: Visit #10      Subjective: Has been using walker full time at home. Notes taking boot off when at home. Pain this date rated 0/10.     Objective: MARY complete per flow chart to facilitate strength, motion and stability to allow ease with daily activities and ambulation. Verbal cuing for progression, technique and order of exercises. Compliant and updated HEP. Initiated supine exercises this date.       24-RTD    Observation:   Test measurements: RLE  PF:40, INV: 28, EV: 18, DF: 12     LLE: DF: 4+/5, PF: 4+/5, IV: 4+/5, EV: 4+/5    Exercises: T-band ok on the Left.  No resistance on the R.   Exercise/Equipment Resistance/Repetitions Other comments        Gt Training with RW Walker  1 lap         Ankle AROM bilaterally  3# 15x HEP      HEP    Ankle Circles  3# 15x ea HEP    Ankle ABC's  3# 1x ea HEP    L Baps  3# L2x 20x    L standing HR/TR  3# 20x     t-band ankle  Left only, x15 green    Seated March/LAQ  20x3#    Seated Hip add/abd  Ball 5\" x 20/grn x 20     3-way hip 20x, foam Open chain, WB on left   HS curls, March, SL squat 20x, foam Open chain, WB on left   STS 15x LLE WB   Golfer  10x    SL cone tap 10x, 3 cones    SL rebounder 20x 6# ball   SL step tap down 20x, 4 inch          S/L bridge 15x R extended

## 2024-01-25 NOTE — PLAN OF CARE
[facility]  Rehabilitation and Sports Medicine    [x] [place]  Phone: [phone]  Fax: [phone]      [] [place]  Phone: [phone]  Fax: [phone]    Physical Therapy Progress Note  Date: 2024        Patient Name:  Adam Farias    :  1995  MRN: [id number]  Restrictions/Precautions:   NWB R LE, WBAT L LE in Cam Boot--Able to wean to a shoe   Medical/Treatment Diagnosis Information:    S82.871D Closed Displaced Pilon Fracture of R Tibia with routine healing  S82.892D Closed Displaced L ankle with Routine Healing            Insurance/Certification information:  [facility]  Physician Information:   Ximena Logan PA-C    Plan of care signed (Y/N):  n  Visit# / total visits:  10/10  Pain level:      4/10     Soreness on LLE, no pain on RLE        Time Period for Report: 24-24   Cancels/No-shows to date:  0    Plan of Care/Treatment to date:  [x] Therapeutic Exercise    [] Modalities:  [x] Therapeutic Activity     [] Ultrasound  [] Electrical Stimulation  [x] Gait Training      [] Cervical Traction    [] Lumbar Traction  [x] Neuromuscular Re-education  [] Cold/hotpack [] Iontophoresis  [x] Instruction in HEP      Other:  [x] Manual Therapy       []    [] Aquatic Therapy       []                           Subjective:       Has been using walker full time at home. Notes taking boot off when at home. Pain this date rated 0/10.      Objective: MARY complete per flow chart to facilitate strength, motion and stability to allow ease with daily activities and ambulation. Verbal cuing for progression, technique and order of exercises. Compliant and updated HEP. Initiated supine exercises this date.         24-RTD     Observation:   Test measurements: RLE  PF:40, INV: 28, EV: 18, DF: 12     LLE: DF: 4+/5, PF: 4+/5, IV: 4+/5, EV: 4+/5     Assessment:  Patient progressing towards all goals, continues to have decreased strength and AROM on the L.  Continues to be NWB on the R.

## 2024-01-26 ENCOUNTER — HOSPITAL ENCOUNTER (OUTPATIENT)
Dept: PHYSICAL THERAPY | Age: 29
Setting detail: THERAPIES SERIES
Discharge: HOME | End: 2024-01-26
Payer: COMMERCIAL

## 2024-01-26 PROCEDURE — 97110 THERAPEUTIC EXERCISES: CPT

## 2024-01-26 NOTE — FLOWSHEET NOTE
Physical Therapy Daily Treatment Note    Date:  2024    Patient Name:  Adam Farias    :  1995  MRN: [id number]  Restrictions/Precautions:   NWB R LE, WBAT L LE in Cam Boot--Able to wean to a shoe   Medical/Treatment Diagnosis Information:    S82.871D Closed Displaced Pilon Fracture of R Tibia with routine healing  S82.892D Closed Displaced L ankle with Routine Healing            Insurance/Certification information:  [employer]  Physician Information:   Ximena Logan PA-C    Plan of care signed (Y/N):  n  Visit# / total visits:   2nd POC Total: 11  Pain level: 4/10 Soreness on LLE, no pain on RLE      RTD: 2024    Time In: 10:13 Time Out: 10:55    Progress Note: []  Yes  [x]  No  Next due by: Visit #10 or by 3/6/24      Subjective: States is feeling pretty good pain wise. Moving is getting easier    Objective: MARY complete per flow chart to facilitate strength, motion and stability to allow ease with daily activities and ambulation. Verbal cuing for progression, technique and order of exercises. Compliant and updated HEP.       24-RTD    Observation:   Test measurements:     Exercises: T-band ok on the Left.  No resistance on the R.   Exercise/Equipment Resistance/Repetitions Other comments        Gt Training with RW Walker  1 lap         Ankle AROM bilaterally  3# 15x HEP      HEP    Ankle Circles  3# 15x ea HEP    Ankle ABC's  3# 1x ea HEP    L Baps  3# L2x 20x    L standing HR/TR  3# 20x     t-band ankle  Left only, x15 green    Seated March/LAQ  20x3#    Seated Hip add/abd  Ball 5\" x 20/grn x 20     3-way hip 20x, foam Open chain, WB on left   HS curls, March, SL squat 20x, foam Open chain, WB on left   STS 15x LLE WB   Golfer  10x    SL cone tap 10x, 3 cones    SL rebounder 20x 6# ball   SL step tap down 20x, 4 inch          S/L bridge 15x R extended   Sidelying hip abd/clam 20x Add weight next session.         SL stool roll 2 laps              [x] Provided verbal/tactile cueing for

## 2024-01-31 ENCOUNTER — HOSPITAL ENCOUNTER (OUTPATIENT)
Dept: PHYSICAL THERAPY | Age: 29
Setting detail: THERAPIES SERIES
Discharge: HOME | End: 2024-01-31
Payer: COMMERCIAL

## 2024-01-31 PROCEDURE — 97110 THERAPEUTIC EXERCISES: CPT

## 2024-01-31 NOTE — FLOWSHEET NOTE
Physical Therapy Daily Treatment Note    Date:  2024    Patient Name:  Adam Farias    :  1995  MRN: [id number]  Restrictions/Precautions:   NWB R LE, WBAT L LE in Cam Boot--Able to wean to a shoe   Medical/Treatment Diagnosis Information:    S82.871D Closed Displaced Pilon Fracture of R Tibia with routine healing  S82.892D Closed Displaced L ankle with Routine Healing            Insurance/Certification information:  [employer]BS  Physician Information:   Ximena Logan PA-C    Plan of care signed (Y/N):  n  Visit# / total visits:   2nd POC Total: 11  Pain level: 4/10 Soreness on LLE, no pain on RLE      RTD: 2024    Time In: 10:00 Time Out: 10:55    Progress Note: []  Yes  [x]  No  Next due by: Visit #10 or by 3/6/24      Subjective: States is feeling pretty good pain wise. Moving is getting easier    Objective: MARY complete per flow chart to facilitate strength, motion and stability to allow ease with daily activities and ambulation. Verbal cuing for progression, technique and order of exercises. Compliant and updated HEP.     24-RTD    Observation:   Test measurements:     Exercises: T-band ok on the Left.  No resistance on the R.   Exercise/Equipment Resistance/Repetitions Other comments        Gt Training with RW Walker  1 lap         Ankle AROM bilaterally  3# 15x HEP      HEP    Ankle Circles  3# 15x ea HEP    Ankle ABC's  3# 1x ea HEP    L Baps  3# L2x 20x    L standing HR/TR  3# 20x     t-band ankle  Left only, x15 green    Seated March/LAQ  20x3#    Seated Hip add/abd  Ball 5\" x 20/grn x 20     3-way hip 20x, foam Open chain, WB on left   HS curls, March, SL squat 20x, foam Open chain, WB on left   STS 15x LLE WB   Golfer  10x    SL cone tap 10x, 3 cones    SL rebounder 20x 6# ball   SL step tap down 20x, 4 inch          S/L bridge 15x R extended   Sidelying hip abd/clam 3# 20x Add weight next session.         SL stool roll 2 laps 3#               [x] Provided verbal/tactile cueing

## 2024-02-02 ENCOUNTER — HOSPITAL ENCOUNTER (OUTPATIENT)
Dept: PHYSICAL THERAPY | Age: 29
Setting detail: THERAPIES SERIES
Discharge: HOME | End: 2024-02-02
Payer: COMMERCIAL

## 2024-02-02 PROCEDURE — 97110 THERAPEUTIC EXERCISES: CPT

## 2024-02-02 NOTE — FLOWSHEET NOTE
Physical Therapy Daily Treatment Note    Date:  2024    Patient Name:  Adam Farias    :  1995  MRN: [id number]  Restrictions/Precautions:   NWB R LE, WBAT L LE in Cam Boot--Able to wean to a shoe   Medical/Treatment Diagnosis Information:    S82.871D Closed Displaced Pilon Fracture of R Tibia with routine healing  S82.892D Closed Displaced L ankle with Routine Healing            Insurance/Certification information:  [employer]BS  Physician Information:   Ximena Logan PA-C    Plan of care signed (Y/N):  n  Visit# / total visits:   2nd POC Total: 12  Pain level: 4/10 Soreness on LLE, no pain on RLE      RTD: 2024    Time In: 10:20 Time Out: 11:01    Progress Note: []  Yes  [x]  No  Next due by: Visit #10 or by 3/6/24      Subjective: States is feeling pretty good. Ready to get his boot off.    Objective: MARY complete per flow chart to facilitate strength, motion and stability to allow ease with daily activities and ambulation. Verbal cuing for progression, technique and order of exercises. Compliant and updated HEP.     24-RTD    Observation:   Test measurements:     Exercises: T-band ok on the Left.  No resistance on the R.   Exercise/Equipment Resistance/Repetitions Other comments        Gt Training with RW Walker  1 lap         Ankle AROM bilaterally  3# 15x HEP      HEP    Ankle Circles  3# 15x ea HEP    Ankle ABC's  3# 1x ea HEP    L [employer] 3# 20x    L standing HR/TR  3# 20x     t-band ankle  Left only, x15 green    Seated March/LAQ  20x3#    Seated Hip add/abd  Ball 5\" x 20/grn x 20     3-way hip 20x, foam Open chain, WB on left   HS curls, March, SL squat 20x, foam Open chain, WB on left   STS 15x LLE WB   Golfer  10x    SL cone tap 10x, 3 cones    SL rebounder 20x 6# ball   SL step tap down 20x, 4 inch          S/L bridge 15x R extended   Sidelying hip abd/clam 3# 20x         SL stool roll 3 laps 3#               [x] Provided verbal/tactile cueing for activities related to

## 2024-02-07 ENCOUNTER — HOSPITAL ENCOUNTER (OUTPATIENT)
Dept: PHYSICAL THERAPY | Age: 29
Setting detail: THERAPIES SERIES
Discharge: HOME | End: 2024-02-07
Payer: COMMERCIAL

## 2024-02-07 NOTE — PROGRESS NOTES
Physical Therapy  Outpatient Physical Therapy    [x] [place]  Phone: [phone]  Fax: [phone]      [] [place]  Phone: [phone]  Fax: [phone]    Physical Therapy  Cancellation/No-show Note  Patient Name:  Adam Farias  :  1995   Date:  2024  Cancelled visits to date: 1  No-shows to date: 0    For today's appointment patient:  [x]  Cancelled  []  Rescheduled appointment  []  No-show     Reason given by patient:  []  Patient ill  []  Conflicting appointment  []  No transportation    []  Conflict with work  []  No reason given  []  Other:     Comments:  family emergency    Electronically signed by: Sunitha Mckeon PTA

## 2024-02-09 ENCOUNTER — HOSPITAL ENCOUNTER (OUTPATIENT)
Dept: PHYSICAL THERAPY | Age: 29
Setting detail: THERAPIES SERIES
Discharge: HOME | End: 2024-02-09
Payer: COMMERCIAL

## 2024-02-09 ENCOUNTER — APPOINTMENT (OUTPATIENT)
Dept: PHYSICAL THERAPY | Age: 29
End: 2024-02-09
Payer: COMMERCIAL

## 2024-02-09 PROCEDURE — 97164 PT RE-EVAL EST PLAN CARE: CPT

## 2024-02-09 PROCEDURE — 97110 THERAPEUTIC EXERCISES: CPT

## 2024-02-09 NOTE — FLOWSHEET NOTE
REBEKAH MCCABE. Met (Completed / updated 2/9/24)  Added 2/9/24: Long Term Goal 7: Patient will return to work without increase in pain or restrictions  Added 2/9/24: Long Term Goal 8: Patient will navigate 8\" stairs without increased pain in reciprocal manner.   Added 2/8/24: Long Term Goal 9: Patient will demonstrate ability to hold bilateral LE SLS for > or = to 45  seconds without increase in pain to allow patient to navigate uneven surfaces with greater ease.     Plan:   [x] Continue per plan of care [] Alter current plan (see comments)  [] Plan of care initiated [] Hold pending MD visit [] Discharge    Plan for Next Session:  Monitor and progress as tolerated to reach goals below.     Electronically signed by:  Lupe Atkins PTA; Oliva Ochoa PT

## 2024-02-09 NOTE — PLAN OF CARE
[facility]  Rehabilitation and Sports Medicine    [x] [place]  Phone: [phone]  Fax: [phone]      [] [place]  Phone: [phone]  Fax: [phone]    Physical Therapy Progress Note  Date: 2024        Patient Name:  Adam Farias    :  1995  MRN: [id number]  Restrictions/Precautions:  WBAT to bilateral LE, able to wean out of boot as tolerated  Medical/Treatment Diagnosis Information:    S82.871D Closed Displaced Pilon Fracture of R Tibia with routine healing  S82.892D Closed Displaced L ankle with Routine Healing            Insurance/Certification information:  [facility]  Physician Information:   Ximena Logan PA-C    Plan of care signed (Y/N):  N  Visit# / total visits:   3rd POC Total: 13  Pain level:      4/10     Soreness on LLE, no pain on RLE    Time Period for Report: 24 - 24   Cancels/No-shows to date:      Plan of Care/Treatment to date:  [x] Therapeutic Exercise    [] Modalities:  [x] Therapeutic Activity     [] Ultrasound  [] Electrical Stimulation  [x] Gait Training      [] Cervical Traction    [] Lumbar Traction  [x] Neuromuscular Re-education  [] Cold/hotpack [] Iontophoresis  [x] Instruction in HEP      Other:  [x] Manual Therapy       []    [] Aquatic Therapy       []                           Subjective: States is feeling pretty good. Walking into  session without.   Pt ambulates without boot. Patient reporting 50% improvement in condition overall. Continues to have difficulty with stair navigation and balance. Will get plantar surface pain after standing for long periods of time.      RTD: around 3/21/24     Objective: Patient arrived with update orders this date. Initially seen by PT Oliva Ochoa this date to complete re-evaluation as well as update goals. Finished session by working with PTA Lupe Atkins.  Reevaluation: 1207 - 122. MARY complete per flow chart to facilitate strength, motion and stability to allow ease with daily activities and

## 2024-02-14 ENCOUNTER — HOSPITAL ENCOUNTER (OUTPATIENT)
Dept: PHYSICAL THERAPY | Age: 29
Setting detail: THERAPIES SERIES
Discharge: HOME | End: 2024-02-14
Payer: COMMERCIAL

## 2024-02-14 ENCOUNTER — APPOINTMENT (OUTPATIENT)
Dept: PHYSICAL THERAPY | Age: 29
End: 2024-02-14
Payer: COMMERCIAL

## 2024-02-14 PROCEDURE — 97110 THERAPEUTIC EXERCISES: CPT

## 2024-02-14 NOTE — FLOWSHEET NOTE
initiated [] Hold pending MD visit [] Discharge    Plan for Next Session:  Monitor and progress as tolerated to reach goals below.     Electronically signed by:  Oliva Ochoa PT; Oliva Ochoa, PT

## 2024-02-16 ENCOUNTER — HOSPITAL ENCOUNTER (OUTPATIENT)
Dept: PHYSICAL THERAPY | Age: 29
Setting detail: THERAPIES SERIES
Discharge: HOME | End: 2024-02-16
Payer: COMMERCIAL

## 2024-02-16 PROCEDURE — 97110 THERAPEUTIC EXERCISES: CPT

## 2024-02-16 NOTE — FLOWSHEET NOTE
Physical Therapy Daily Treatment Note    Date:  2024    Patient Name:  Adam Farias    :  1995  MRN: [id number]  Restrictions/Precautions:    WBAT to bilateral LE, able to wean out of boot as tolerated   Medical/Treatment Diagnosis Information:    S82.871D Closed Displaced Pilon Fracture of R Tibia with routine healing  S82.892D Closed Displaced L ankle with Routine Healing            Insurance/Certification information:  [employer]  Physician Information:   Ximena Logan PA-C    Plan of care signed (Y/N):  n  Visit# / total visits:  3 3rd POC Total: 15  Pain level: 0/10       Time In: 1016   Time Out:1057    Progress Note: []  Yes  [x]  No  Next due by: Visit #12 or by 24      Subjective: No pain this morning in ankles. Has some muscle soreness overall. Been able to do more at home again.    RTD: around 3/21/24    Objective: MARY completed per flow chart to facilitate strength, motion and stability to allow ease with daily activities and ambulation. Verbal cuing for progression, technique and order of exercises.     Observation  Test measurements:     Per new order 24: PT is WBAT on bilateral LE, orders were to wean out of boot as tolerated. Continued to educate importance of utilizing CAM boot and  weaning protocol, however pt arrived without boot donned and reproting that he has not been using the boot at all.     Patient plans to return to work in April. Has to be able to push/pull, climb and carry heavy equipment ---as current exercises get easier add the heavy work exercises    Exercises: WBAT on bilateral LE, weaning out of boot.   Exercise/Equipment Resistance/Repetitions Other comments        Gt Training with RW Walker         Ankle AROM bilaterally  HEP    Ankle Circles  HEP    Ankle ABC's  HEP    [employer]     T-band ankle  X15 GRN    Seated March/LAQ     Seated Hip add/abd               Gastroc slant board stretch  3x30\"    3-way hip 20x,  WBAT    no UEs   HS curls, March, HR/TR 20x,  No UEs

## 2024-02-21 ENCOUNTER — HOSPITAL ENCOUNTER (OUTPATIENT)
Dept: PHYSICAL THERAPY | Age: 29
Setting detail: THERAPIES SERIES
Discharge: HOME | End: 2024-02-21
Payer: COMMERCIAL

## 2024-02-21 PROCEDURE — 97110 THERAPEUTIC EXERCISES: CPT

## 2024-02-21 NOTE — FLOWSHEET NOTE
Steps  10x 6\" step  F, L        Sled push/pull 3 laps 110#   Crate carry 3 laps 25# in box   Crate lift to counter 10x    Crate lift 10x    Crate lift and stairs                 Rock blade 5' Scar massage   [] Provided verbal/tactile cueing for activities related to strengthening, flexibility, endurance, ROM. (99346)   [] Provided verbal/tactile cueing for activities related to improving balance, coordination, kinesthetic sense, posture, motor skill, proprioception. (16358)    Therapeutic Activities:     [] Therapeutic activities, direct (one-on-one) patient contact (use of dynamic activities to improve functional performance). (88346)    Gait:   [] Provided training and instruction to the patient for ambulation re-education. (68001)    Self-Care/ADL's  [] Self-care/home management training and compensatory training, meal preparation, safety procedures, and instructions in use of assistive technology devices/adaptive equipment, direct one-on-one contact. (77319)    Home Exercise Program:     [x] Reviewed/Progressed HEP activities related to strengthening, flexibility, endurance, ROM. (75196)  [] Reviewed/Progressed HEP activities related to improving balance, coordination, kinesthetic sense, posture, motor skill, proprioception.  (98872)    Manual Treatments:    [] Provided manual therapy to mobilize soft tissue/joints for the purpose of modulating pain, promoting relaxation,  increasing ROM, reducing/eliminating soft tissue swelling/inflammation/restriction, improving soft tissue extensibility. (16956)    Service Based Modalities:      Timed Code Treatment Minutes:   44' MARY    Total Treatment Minutes:   44'     Treatment/Activity Tolerance:  [x] Patient tolerated treatment well [] Patient limited by fatique  [] Patient limited by pain  [] Patient limited by other medical complications  [] Other:     Prognosis: [x] Good [] Fair  [] Poor    Patient Requires Follow-up: [x] Yes  [] No      Goals:  Short Term

## 2024-02-23 ENCOUNTER — HOSPITAL ENCOUNTER (OUTPATIENT)
Dept: PHYSICAL THERAPY | Age: 29
Setting detail: THERAPIES SERIES
Discharge: HOME | End: 2024-02-23
Payer: COMMERCIAL

## 2024-02-23 PROCEDURE — 97110 THERAPEUTIC EXERCISES: CPT

## 2024-02-23 NOTE — FLOWSHEET NOTE
(compliant)  Updated 2/9/24: Long Term Goal 2: Improve bilateral Ankle AROM to DF 5, PF to 40, Inv to 35, Ev to 10 to allow for normal gait pattern. (Above)   Updated 2/9/24) Long Term Goal 3: Improve bilateral ankle strength to 5/5 to improve gait tolerance. (Above)  Long Term Goal 4: Patient to be able to amb with NWB on the R LE x 250ft with least restrictive device and mod I met (able with RW)  Updated 2/9/24: Long Term Goal 5: Improve Foot Function Index to 50% DIsabiltiy or less. (37% this date, updated this date to match new orders/status  Additional Goals?: Yes  Long term goal 6: Add additional goals upon allowance for Weightbearing of the R LE. Met (Completed / updated 2/9/24)  Added 2/9/24: Long Term Goal 7: Patient will return to work without increase in pain or restrictions  Added 2/9/24: Long Term Goal 8: Patient will navigate 8\" stairs without increased pain in reciprocal manner.   Added 2/8/24: Long Term Goal 9: Patient will demonstrate ability to hold bilateral LE SLS for > or = to 45  seconds without increase in pain to allow patient to navigate uneven surfaces with greater ease.     Plan:   [x] Continue per plan of care [] Alter current plan (see comments)  [] Plan of care initiated [] Hold pending MD visit [] Discharge    Plan for Next Session:  Monitor and progress as tolerated to reach goals below.     Electronically signed by:  Sunitha Mckeon PTA

## 2024-02-28 ENCOUNTER — HOSPITAL ENCOUNTER (OUTPATIENT)
Dept: PHYSICAL THERAPY | Age: 29
Setting detail: THERAPIES SERIES
Discharge: HOME | End: 2024-02-28
Payer: COMMERCIAL

## 2024-02-28 PROCEDURE — 97110 THERAPEUTIC EXERCISES: CPT | Performed by: PHYSICAL THERAPY ASSISTANT

## 2024-02-28 NOTE — FLOWSHEET NOTE
Physical Therapy Daily Treatment Note    Date:  2024    Patient Name:  Adam Farias    :  1995  MRN: [id number]  Restrictions/Precautions:    WBAT to bilateral LE, able to wean out of boot as tolerated   Medical/Treatment Diagnosis Information:    S82.871D Closed Displaced Pilon Fracture of R Tibia with routine healing  S82.892D Closed Displaced L ankle with Routine Healing            Insurance/Certification information:  BCBS  Physician Information:   Ximena Logan PA-C    Plan of care signed (Y/N):  y  Visit# / total visits:   3rd POC Total: 16  Pain level: 0/10       Time In: 1030   Time Out:  1118    Progress Note: []  Yes  [x]  No  Next due by: Visit #12 or by 24      Subjective:  Notes general soreness this date after spending time working in yard previous day.     RTD: around 3/21/24    Objective: MARY completed per flow chart to facilitate strength, motion and stability to allow ease with daily activities and ambulation. Verbal cuing for progression, technique and order of exercises. Advanced and progressed several exercises to improve strength for return to work. Good overall tolerance to exercises.     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   Exercise/Equipment Resistance/Repetitions Other comments   Bike 5'         Gastroc slant board stretch  3x30\"    3-way hip 20x,  WBAT    no UEs   HS curls, March, HR/TR 20x, 2# No UEs   STS     squats 5x 9 positions, foam    SLS 2x30\" ea    Golfer  10x ea    SL cone tap 10x, 5 cones ea    SL rebounder 10x 6 lb SL fwd and side   lunges 10x, BOSU F, L   steps   15 laps in gym    Steps   F, L, R        Sled push/pull 3 laps 115#   Crate carry 3 laps 25# in box   Crate lift to counter 10x 35# in box   Crate lift     Crate lift and stairs        Cybex 7 step ups  10x Seat 2

## 2024-03-01 ENCOUNTER — HOSPITAL ENCOUNTER (OUTPATIENT)
Dept: PHYSICAL THERAPY | Age: 29
Setting detail: THERAPIES SERIES
Discharge: HOME | End: 2024-03-01
Payer: COMMERCIAL

## 2024-03-01 PROCEDURE — 97110 THERAPEUTIC EXERCISES: CPT | Performed by: PHYSICAL THERAPY ASSISTANT

## 2024-03-01 NOTE — FLOWSHEET NOTE
Goals  Time Frame for Short Term Goals: 3 weeks  Short Term Goal 1: Initiate HEP (compliant)    Long Term Goals  Time Frame for Long Term Goals : 6 weeks  Long Term Goal 1: Independent in HEP (compliant)  Updated 2/9/24: Long Term Goal 2: Improve bilateral Ankle AROM to DF 5, PF to 40, Inv to 35, Ev to 10 to allow for normal gait pattern. (Above)   Updated 2/9/24) Long Term Goal 3: Improve bilateral ankle strength to 5/5 to improve gait tolerance.   Long Term Goal 4: Patient to be able to amb with NWB on the R LE x 250ft with least restrictive device and mod I   Updated 2/9/24: Long Term Goal 5: Improve Foot Function Index to 50% DIsabiltiy or less.   Additional Goals?: Yes  Long term goal 6: Add additional goals upon allowance for Weightbearing of the R LE.   Added 2/9/24: Long Term Goal 7: Patient will return to work without increase in pain or restrictions  Added 2/9/24: Long Term Goal 8: Patient will navigate 8\" stairs without increased pain in reciprocal manner.   Added 2/8/24: Long Term Goal 9: Patient will demonstrate ability to hold bilateral LE SLS for > or = to 45  seconds without increase in pain to allow patient to navigate uneven surfaces with greater ease.     Plan:   [x] Continue per plan of care [] Alter current plan (see comments)  [] Plan of care initiated [] Hold pending MD visit [] Discharge    Plan for Next Session:  Monitor and progress as tolerated to reach goals below.     Electronically signed by:  Tadeo Jung PTA

## 2024-03-06 ENCOUNTER — HOSPITAL ENCOUNTER (OUTPATIENT)
Dept: PHYSICAL THERAPY | Age: 29
Setting detail: THERAPIES SERIES
Discharge: HOME | End: 2024-03-06
Payer: COMMERCIAL

## 2024-03-06 PROCEDURE — 97110 THERAPEUTIC EXERCISES: CPT

## 2024-03-06 NOTE — FLOWSHEET NOTE
Term Goals  Time Frame for Short Term Goals: 3 weeks  Short Term Goal 1: Initiate HEP (compliant)    Long Term Goals  Time Frame for Long Term Goals : 6 weeks  Long Term Goal 1: Independent in HEP (compliant)  Updated 2/9/24: Long Term Goal 2: Improve bilateral Ankle AROM to DF 5, PF to 40, Inv to 35, Ev to 10 to allow for normal gait pattern. (Above)   Updated 2/9/24) Long Term Goal 3: Improve bilateral ankle strength to 5/5 to improve gait tolerance.   Long Term Goal 4: Patient to be able to amb with NWB on the R LE x 250ft with least restrictive device and mod I   Updated 2/9/24: Long Term Goal 5: Improve Foot Function Index to 50% DIsabiltiy or less.   Additional Goals?: Yes  Long term goal 6: Add additional goals upon allowance for Weightbearing of the R LE.   Added 2/9/24: Long Term Goal 7: Patient will return to work without increase in pain or restrictions  Added 2/9/24: Long Term Goal 8: Patient will navigate 8\" stairs without increased pain in reciprocal manner.   Added 2/8/24: Long Term Goal 9: Patient will demonstrate ability to hold bilateral LE SLS for > or = to 45  seconds without increase in pain to allow patient to navigate uneven surfaces with greater ease.     Plan:   [x] Continue per plan of care [] Alter current plan (see comments)  [] Plan of care initiated [] Hold pending MD visit [] Discharge    Plan for Next Session:  Monitor and progress as tolerated to reach goals below.     Electronically signed by:  Lupe Atkins PTA

## 2024-03-08 ENCOUNTER — HOSPITAL ENCOUNTER (OUTPATIENT)
Dept: PHYSICAL THERAPY | Age: 29
Setting detail: THERAPIES SERIES
Discharge: HOME | End: 2024-03-08
Payer: COMMERCIAL

## 2024-03-08 PROCEDURE — 97110 THERAPEUTIC EXERCISES: CPT

## 2024-03-08 NOTE — FLOWSHEET NOTE
Rock blade hep Scar massage   [x] Provided verbal/tactile cueing for activities related to strengthening, flexibility, endurance, ROM. (54783)   [] Provided verbal/tactile cueing for activities related to improving balance, coordination, kinesthetic sense, posture, motor skill, proprioception. (74955)    Therapeutic Activities:     [] Therapeutic activities, direct (one-on-one) patient contact (use of dynamic activities to improve functional performance). (99897)    Gait:   [] Provided training and instruction to the patient for ambulation re-education. (08997)    Self-Care/ADL's  [] Self-care/home management training and compensatory training, meal preparation, safety procedures, and instructions in use of assistive technology devices/adaptive equipment, direct one-on-one contact. (90308)    Home Exercise Program:     [x] Reviewed/Progressed HEP activities related to strengthening, flexibility, endurance, ROM. (84360)  [] Reviewed/Progressed HEP activities related to improving balance, coordination, kinesthetic sense, posture, motor skill, proprioception.  (77143)    Manual Treatments:    [] Provided manual therapy to mobilize soft tissue/joints for the purpose of modulating pain, promoting relaxation,  increasing ROM, reducing/eliminating soft tissue swelling/inflammation/restriction, improving soft tissue extensibility. (57492)    Service Based Modalities:      Timed Code Treatment Minutes:   38' MARY    Total Treatment Minutes:   38'     Treatment/Activity Tolerance:  [x] Patient tolerated treatment well [] Patient limited by fatique  [] Patient limited by pain  [] Patient limited by other medical complications  [] Other:     Prognosis: [x] Good [] Fair  [] Poor    Patient Requires Follow-up: [x] Yes  [] No      Goals:  Short Term Goals  Time Frame for Short Term Goals: 3 weeks  Short Term Goal 1: Initiate HEP (compliant)    Long Term Goals  Time Frame for Long Term Goals : 6 weeks  Long Term Goal 1:

## 2024-03-13 ENCOUNTER — APPOINTMENT (OUTPATIENT)
Dept: PHYSICAL THERAPY | Age: 29
End: 2024-03-13
Payer: COMMERCIAL

## 2024-03-15 ENCOUNTER — APPOINTMENT (OUTPATIENT)
Dept: PHYSICAL THERAPY | Age: 29
End: 2024-03-15
Payer: COMMERCIAL

## 2024-03-20 ENCOUNTER — APPOINTMENT (OUTPATIENT)
Dept: PHYSICAL THERAPY | Age: 29
End: 2024-03-20
Payer: COMMERCIAL

## 2024-03-22 ENCOUNTER — APPOINTMENT (OUTPATIENT)
Dept: PHYSICAL THERAPY | Age: 29
End: 2024-03-22
Payer: COMMERCIAL

## 2024-03-27 ENCOUNTER — APPOINTMENT (OUTPATIENT)
Dept: PHYSICAL THERAPY | Age: 29
End: 2024-03-27
Payer: COMMERCIAL

## 2024-03-29 ENCOUNTER — APPOINTMENT (OUTPATIENT)
Dept: PHYSICAL THERAPY | Age: 29
End: 2024-03-29
Payer: COMMERCIAL

## 2024-12-05 ENCOUNTER — OFFICE VISIT (OUTPATIENT)
Dept: FAMILY MEDICINE CLINIC | Age: 29
End: 2024-12-05
Payer: COMMERCIAL

## 2024-12-05 VITALS
WEIGHT: 211 LBS | OXYGEN SATURATION: 97 % | DIASTOLIC BLOOD PRESSURE: 64 MMHG | SYSTOLIC BLOOD PRESSURE: 118 MMHG | BODY MASS INDEX: 29.54 KG/M2 | HEART RATE: 77 BPM | HEIGHT: 71 IN

## 2024-12-05 DIAGNOSIS — Z00.00 ENCOUNTER FOR WELL ADULT EXAM WITHOUT ABNORMAL FINDINGS: Primary | ICD-10-CM

## 2024-12-05 DIAGNOSIS — J40 BRONCHITIS: ICD-10-CM

## 2024-12-05 PROCEDURE — 99395 PREV VISIT EST AGE 18-39: CPT | Performed by: STUDENT IN AN ORGANIZED HEALTH CARE EDUCATION/TRAINING PROGRAM

## 2024-12-05 RX ORDER — AZITHROMYCIN 250 MG/1
TABLET, FILM COATED ORAL
Qty: 6 TABLET | Refills: 0 | Status: SHIPPED | OUTPATIENT
Start: 2024-12-05 | End: 2024-12-15

## 2024-12-05 SDOH — ECONOMIC STABILITY: INCOME INSECURITY: HOW HARD IS IT FOR YOU TO PAY FOR THE VERY BASICS LIKE FOOD, HOUSING, MEDICAL CARE, AND HEATING?: NOT HARD AT ALL

## 2024-12-05 SDOH — ECONOMIC STABILITY: FOOD INSECURITY: WITHIN THE PAST 12 MONTHS, THE FOOD YOU BOUGHT JUST DIDN'T LAST AND YOU DIDN'T HAVE MONEY TO GET MORE.: NEVER TRUE

## 2024-12-05 SDOH — ECONOMIC STABILITY: FOOD INSECURITY: WITHIN THE PAST 12 MONTHS, YOU WORRIED THAT YOUR FOOD WOULD RUN OUT BEFORE YOU GOT MONEY TO BUY MORE.: NEVER TRUE

## 2024-12-05 ASSESSMENT — ENCOUNTER SYMPTOMS
VOMITING: 0
DIARRHEA: 0
ABDOMINAL PAIN: 0
NAUSEA: 0
CONSTIPATION: 0
TROUBLE SWALLOWING: 0
SHORTNESS OF BREATH: 0
BLOOD IN STOOL: 0
EYE PAIN: 0
COUGH: 0

## 2024-12-05 ASSESSMENT — PATIENT HEALTH QUESTIONNAIRE - PHQ9
1. LITTLE INTEREST OR PLEASURE IN DOING THINGS: NOT AT ALL
SUM OF ALL RESPONSES TO PHQ QUESTIONS 1-9: 0
SUM OF ALL RESPONSES TO PHQ QUESTIONS 1-9: 0
SUM OF ALL RESPONSES TO PHQ9 QUESTIONS 1 & 2: 0
SUM OF ALL RESPONSES TO PHQ QUESTIONS 1-9: 0
SUM OF ALL RESPONSES TO PHQ QUESTIONS 1-9: 0
2. FEELING DOWN, DEPRESSED OR HOPELESS: NOT AT ALL

## 2024-12-05 NOTE — PROGRESS NOTES
Chief Complaint   Patient presents with    Annual Exam    Sinus Problem     Sx started over 1 week ago. Congestion, cough. Has not tried OTC medication          SUBJECTIVE:  Adam Farias is a 29 y.o. male for physical exam.    Doing well no major concerns at this time.  Does have some sinus issues as well as suspected bronchitis.  Feels like this been going on for about 6 days or so.    Had some lower extremity aches and pains that time.  Feels like his screws are popping out from his prior surgeries.  Does have some rubbing of his screws as well particularly while he is wearing his boots at work.    Health Maintenance reviewed - up to date.    History reviewed. No pertinent past medical history.    Past Surgical History:   Procedure Laterality Date    ANKLE SURGERY      bilat ankle surgery 2023    EXCISION/BIOPSY  03/12/2021    MULTIPLE BENIGN SCALP [id number]    OTHER SURGICAL HISTORY      tib/fib surgery right leg 2023    WISDOM TOOTH EXTRACTION         Social History     Socioeconomic History    Marital status: Single     Spouse name: Not on file    Number of children: Not on file    Years of education: Not on file    Highest education level: Not on file   Occupational History    Occupation:    Tobacco Use    Smoking status: Never    Smokeless tobacco: Former     Types: Chew     Quit date: 2018   Vaping Use    Vaping status: Never Used   Substance and Sexual Activity    Alcohol use: Yes     Comment: 1 beer every couple of months.    Drug use: No    Sexual activity: Not on file   Other Topics Concern    Not on file   Social History Narrative    Not on file     Social Determinants of Health     Financial Resource Strain: Low Risk  (12/5/2024)    Overall Financial Resource Strain (CARDIA)     Difficulty of Paying Living Expenses: Not hard at all   Food Insecurity: No Food Insecurity (12/5/2024)    Hunger Vital Sign     Worried About Running Out of Food in the Last Year: Never true     Ran Out of

## 2025-03-03 ENCOUNTER — OFFICE VISIT (OUTPATIENT)
Dept: PRIMARY CARE CLINIC | Age: 30
End: 2025-03-03

## 2025-03-03 VITALS
HEIGHT: 70 IN | OXYGEN SATURATION: 98 % | WEIGHT: 209 LBS | HEART RATE: 104 BPM | SYSTOLIC BLOOD PRESSURE: 108 MMHG | DIASTOLIC BLOOD PRESSURE: 60 MMHG | TEMPERATURE: 98 F | BODY MASS INDEX: 29.92 KG/M2

## 2025-03-03 DIAGNOSIS — R05.1 ACUTE COUGH: ICD-10-CM

## 2025-03-03 DIAGNOSIS — J10.1 INFLUENZA A: Primary | ICD-10-CM

## 2025-03-03 LAB
INFLUENZA A ANTIGEN, POC: POSITIVE
INFLUENZA B ANTIGEN, POC: NEGATIVE
LOT EXPIRE DATE: ABNORMAL
LOT KIT NUMBER: ABNORMAL
SARS-COV-2, POC: ABNORMAL
VALID INTERNAL CONTROL: ABNORMAL
VENDOR AND KIT NAME POC: ABNORMAL

## 2025-03-03 ASSESSMENT — ENCOUNTER SYMPTOMS
RHINORRHEA: 1
ABDOMINAL PAIN: 0
VOMITING: 0
COUGH: 1
DIARRHEA: 0
NAUSEA: 0
SORE THROAT: 1
SINUS PAIN: 1

## 2025-03-03 NOTE — PROGRESS NOTES
[facility] Walk [facility] department of [facility]  [address] [facility] [address]  Phone: [phone]  Fax: [phone]      Adam Farias  1995  MRN: [id number]  Date of visit: 3/3/2025    Chief Complaint:     Adam Farias is here for c/o of Cough (Congestion hot flashes since Friday bad headace)      HPI:     Adam Farias is a 29 y.o. male who presents to the [facility] today for his medical conditions/complaints as noted below.    Cold Symptoms   This is a new problem. Episode onset: 3 days. The problem has been gradually worsening. There has been no fever. Associated symptoms include congestion, coughing, headaches, a plugged ear sensation, rhinorrhea, sinus pain and a sore throat. Pertinent negatives include no abdominal pain, diarrhea, ear pain, nausea or vomiting. Treatments tried: dayquil, nyquil, ibuprofen. The treatment provided moderate relief.   Sick contacts    History reviewed. No pertinent past medical history.     Allergies   Allergen Reactions    Ceclor [Cefaclor]      Doesn't remember what type of reaction    Milk (Cow) Other (See Comments)    Pcn [Penicillins]      Doesn't remember         Subjective:      Review of Systems   Constitutional:  Positive for chills and fever.   HENT:  Positive for congestion, rhinorrhea, sinus pain and sore throat. Negative for ear pain.    Respiratory:  Positive for cough.    Gastrointestinal:  Negative for abdominal pain, diarrhea, nausea and vomiting.   Neurological:  Positive for headaches.       Objective:     Vitals:    03/03/25 1239   BP: 108/60   Site: Left Upper Arm   Position: Sitting   Cuff Size: Large Adult   Pulse: (!) 104   Temp: 98 °F (36.7 °C)   SpO2: 98%   Weight: 94.8 kg (209 lb)   Height: 1.778 m (5' 10\")     Body mass index is 29.99 kg/m².    Physical Exam  Vitals and nursing note reviewed.   Constitutional:       Appearance: Normal appearance. He is ill-appearing.      Comments: Dry cough

## 2025-05-20 ENCOUNTER — TELEPHONE (OUTPATIENT)
Dept: FAMILY MEDICINE CLINIC | Age: 30
End: 2025-05-20

## 2025-05-20 DIAGNOSIS — M54.50 ACUTE BILATERAL LOW BACK PAIN, UNSPECIFIED WHETHER SCIATICA PRESENT: Primary | ICD-10-CM

## 2025-05-20 RX ORDER — PREDNISONE 20 MG/1
20 TABLET ORAL 2 TIMES DAILY
Qty: 10 TABLET | Refills: 0 | Status: SHIPPED | OUTPATIENT
Start: 2025-05-20 | End: 2025-05-25

## 2025-05-20 NOTE — TELEPHONE ENCOUNTER
Wife presented to office. Patient having low back pain with sciatica. Will trial prednisone for 5 days.

## 2025-07-02 ENCOUNTER — OFFICE VISIT (OUTPATIENT)
Dept: FAMILY MEDICINE CLINIC | Age: 30
End: 2025-07-02

## 2025-07-02 VITALS
BODY MASS INDEX: 29.99 KG/M2 | TEMPERATURE: 97.8 F | HEART RATE: 99 BPM | SYSTOLIC BLOOD PRESSURE: 124 MMHG | WEIGHT: 209.5 LBS | DIASTOLIC BLOOD PRESSURE: 78 MMHG | HEIGHT: 70 IN | OXYGEN SATURATION: 100 %

## 2025-07-02 DIAGNOSIS — J01.90 ACUTE BACTERIAL SINUSITIS: Primary | ICD-10-CM

## 2025-07-02 DIAGNOSIS — B96.89 ACUTE BACTERIAL SINUSITIS: Primary | ICD-10-CM

## 2025-07-02 RX ORDER — LEVOFLOXACIN 500 MG/1
500 TABLET, FILM COATED ORAL DAILY
Qty: 10 TABLET | Refills: 0 | Status: SHIPPED | OUTPATIENT
Start: 2025-07-02 | End: 2025-07-12

## 2025-07-02 SDOH — ECONOMIC STABILITY: FOOD INSECURITY: WITHIN THE PAST 12 MONTHS, YOU WORRIED THAT YOUR FOOD WOULD RUN OUT BEFORE YOU GOT MONEY TO BUY MORE.: NEVER TRUE

## 2025-07-02 SDOH — ECONOMIC STABILITY: FOOD INSECURITY: WITHIN THE PAST 12 MONTHS, THE FOOD YOU BOUGHT JUST DIDN'T LAST AND YOU DIDN'T HAVE MONEY TO GET MORE.: NEVER TRUE

## 2025-07-02 ASSESSMENT — PATIENT HEALTH QUESTIONNAIRE - PHQ9
SUM OF ALL RESPONSES TO PHQ QUESTIONS 1-9: 0
2. FEELING DOWN, DEPRESSED OR HOPELESS: NOT AT ALL
SUM OF ALL RESPONSES TO PHQ QUESTIONS 1-9: 0
1. LITTLE INTEREST OR PLEASURE IN DOING THINGS: NOT AT ALL

## 2025-07-02 NOTE — PROGRESS NOTES
[facility]  [address]   [place] [address]  Dept: [phone]  Dept Fax: [phone]  Loc: [phone]      Adam Farias is a 30 y.o. male who presents today for:  Chief Complaint   Patient presents with    Sinusitis     C/o congestion, green/yellow drainage         Goals    None         HPI:     HPI  Patient is a 30-year-old male presents the office today for evaluation of sinus issues.  Congestion, green/yellow drainage ongoing for the past week or so.  Symptoms have gradually worsened.  Unsure of any sick contacts.    No past medical history on file.   Past Surgical History:   Procedure Laterality Date    ANKLE SURGERY      bilat ankle surgery 2023    EXCISION/BIOPSY  03/12/2021    MULTIPLE BENIGN SCALP [id number]    OTHER SURGICAL HISTORY      tib/fib surgery right leg 2023    WISDOM TOOTH EXTRACTION       Family History   Problem Relation Age of Onset    Cancer Paternal Grandmother         liver    No Known Problems Mother     No Known Problems Father     No Known Problems Sister     Colon Cancer Neg Hx     Colon Polyps Neg Hx     Crohn's Disease Neg Hx     Ulcerative Colitis Neg Hx      Social History     Tobacco Use    Smoking status: Never    Smokeless tobacco: Former     Types: Chew     Quit date: 2018   Substance Use Topics    Alcohol use: Not Currently     Comment: 1 beer every couple of months.      Current Outpatient Medications   Medication Sig Dispense Refill    levoFLOXacin (LEVAQUIN) 500 MG tablet Take 1 tablet by mouth daily for 10 days 10 tablet 0     No current facility-administered medications for this visit.     Allergies   Allergen Reactions    Ceclor [Cefaclor]      Doesn't remember what type of reaction    Milk (Cow) Other (See Comments)    Pcn [Penicillins]      Doesn't remember       Health Maintenance   Topic Date Due    Varicella vaccine (1 of 2 - 13+ 2-dose series) Never done    HIV screen

## 2025-07-03 ASSESSMENT — ENCOUNTER SYMPTOMS
SHORTNESS OF BREATH: 0
COUGH: 1
EYE PAIN: 0
VOMITING: 0
ABDOMINAL PAIN: 0
DIARRHEA: 0
BLOOD IN STOOL: 0
SINUS PAIN: 1
NAUSEA: 0
SINUS PRESSURE: 1
TROUBLE SWALLOWING: 0
CONSTIPATION: 0
RHINORRHEA: 1